# Patient Record
Sex: FEMALE | Race: WHITE | NOT HISPANIC OR LATINO
[De-identification: names, ages, dates, MRNs, and addresses within clinical notes are randomized per-mention and may not be internally consistent; named-entity substitution may affect disease eponyms.]

---

## 2024-04-09 PROBLEM — Z00.00 ENCOUNTER FOR PREVENTIVE HEALTH EXAMINATION: Status: ACTIVE | Noted: 2024-04-09

## 2024-04-15 ENCOUNTER — NON-APPOINTMENT (OUTPATIENT)
Age: 79
End: 2024-04-15

## 2024-04-16 ENCOUNTER — NON-APPOINTMENT (OUTPATIENT)
Age: 79
End: 2024-04-16

## 2024-04-16 ENCOUNTER — APPOINTMENT (OUTPATIENT)
Dept: ORTHOPEDIC SURGERY | Facility: CLINIC | Age: 79
End: 2024-04-16
Payer: MEDICARE

## 2024-04-16 VITALS — WEIGHT: 168 LBS | BODY MASS INDEX: 28.68 KG/M2 | HEIGHT: 64 IN

## 2024-04-16 DIAGNOSIS — M17.11 UNILATERAL PRIMARY OSTEOARTHRITIS, RIGHT KNEE: ICD-10-CM

## 2024-04-16 PROCEDURE — 73564 X-RAY EXAM KNEE 4 OR MORE: CPT | Mod: RT

## 2024-04-16 PROCEDURE — 99204 OFFICE O/P NEW MOD 45 MIN: CPT

## 2024-04-17 PROBLEM — M17.11 PRIMARY LOCALIZED OSTEOARTHRITIS OF RIGHT KNEE: Status: ACTIVE | Noted: 2024-04-17

## 2024-04-17 NOTE — ADDENDUM
[FreeTextEntry1] : This note was written by Katherin Lu on 04/16/2024 acting solely as a scribe for Dr. Uli Rico.  All medical record entries made by the Scribe were at my, Dr. Uli Rico, direction and personally dictated by me on 04/16/2024. I have personally reviewed the chart and agree that the record accurately reflects my personal performance of the history, physical exam, assessment and plan.

## 2024-04-17 NOTE — PHYSICAL EXAM
[de-identified] : Right Knee Exam:   Skin: Clean, dry, intact Inspection: No obvious malalignment, no masses, mild swelling, mild effusion Pulses: 2+ DP/PT pulses ROM: 0-120 degrees of flexion. No pain with deep knee flexion/extension. Tenderness: +MJLT. +LJLT. +pain over the patella facets.  Stability: Stable to varus, valgus. Negative Lachman testing. Negative anterior drawer, negative posterior drawer. Strength: 5/5 Q/H/TA/GS/EHL, without atrophy Neuro: Intact to light touch throughout, DTRs normal [de-identified] : The following radiographs were ordered and read by me during this patients visit. I reviewed each radiograph in detail with the patient and discussed the findings as highlighted below.   4 views of the right knee were obtained today, 04/16/2024, that show no acute fracture or dislocation. There is moderate medial, mild lateral and moderate patellofemoral degenerative changes seen. There is no significant malalignment. No significant other obvious osseous abnormality, otherwise unremarkable.

## 2024-04-17 NOTE — HISTORY OF PRESENT ILLNESS
[de-identified] : 78 year old female presents today with right knee pain. Patient was under the care of Dr. Jonas, HA injection received in January 2024, and Steroid injection February 2024 without relief. PT has been minimally helpful. She was told that her pain was coming from her back she received epidural without relief. The pain is constant worse with prolonged standing, climbing stairs and walking. Takes Tylenol/ Advil PRN with min relief.   The patient's past medical history, past surgical history, medications and allergies were reviewed by me today with the patient and documented accordingly. In addition, the patient's family and social history, which were noncontributory to this visit, were reviewed also.

## 2024-04-17 NOTE — DISCUSSION/SUMMARY
[de-identified] : 77 y/o female with right knee osteoarthritis.   I discussed the treatment of degenerative arthritis with the patient at length today, as well as the chronic degenerative process and likely future progression of the disease. Pain may be related to the bony degenerative changes seen on XR imaging, or the associated degeneration to the soft tissues within the knee joint, including the cartilage, meniscus, or ligamentous structures.  I described the spectrum of treatment options available including nonoperative modalities to total joint arthroplasty. Noninvasive and nonoperative treatment modalities include weight reduction, activity modification with low impact exercise, PRN use of acetaminophen or anti-inflammatory medication, natural anti-inflammatory supplements, glucosamine/chondroitin, and physical therapy (for strengthening and conditioning). More invasive treatments can include injection therapies; including cortisone, hyaluronic acid (visco-supplementation), or platelet-rish-plasma (PRP) injections. Definitive treatment could also include future total joint arthroplasty if symptoms persist and cause prolonged disability or interference with activities of daily living.   The risks and benefits of each treatment options was discussed and all questions were answered. At this time recommendations are for conservative and symptommatic care as detailed above with impact loading activity restriction, low impact exercise and avoidance of strenuous activity. Other simple recommendations include OTC NSAID's or acetaminophen (if tolerated/able), with application of ice to the knee 2-3x daily for 20 minutes or after periods of activity.   Given the degree of arthrosis present on imaging, I discussed potential limitations of significant symptommatic improvement with prolonged conservative treatment as outline. Patient may benefit from consideration of TKA at this time.   Referral provided for Orthopaedic Arthroplasty consultation.   Follow up as needed.